# Patient Record
Sex: MALE | Race: WHITE | Employment: FULL TIME | URBAN - METROPOLITAN AREA
[De-identification: names, ages, dates, MRNs, and addresses within clinical notes are randomized per-mention and may not be internally consistent; named-entity substitution may affect disease eponyms.]

---

## 2023-02-20 ENCOUNTER — OFFICE VISIT (OUTPATIENT)
Dept: ORTHOPEDIC SURGERY | Age: 43
End: 2023-02-20
Payer: COMMERCIAL

## 2023-02-20 DIAGNOSIS — M25.511 ACUTE PAIN OF RIGHT SHOULDER: Primary | ICD-10-CM

## 2023-02-20 DIAGNOSIS — M75.81 ROTATOR CUFF TENDINITIS, RIGHT: ICD-10-CM

## 2023-02-20 PROCEDURE — 99203 OFFICE O/P NEW LOW 30 MIN: CPT | Performed by: ORTHOPAEDIC SURGERY

## 2023-02-20 NOTE — LETTER
2/20/2023    Patient: Tish Hutson   YOB: 1980   Date of Visit: 2/20/2023     Lizandro Baldwin MD  [address] [place] [id number]  Via Fax: [phone]    Dear Lizandro Baldwin MD,      Thank you for referring Mr. Tish Hutson to [facility] for evaluation. My notes for this consultation are attached. If you have questions, please do not hesitate to call me. I look forward to following your patient along with you.       Sincerely,    Christina Lim MD

## 2023-03-15 ENCOUNTER — OFFICE VISIT (OUTPATIENT)
Dept: ORTHOPEDIC SURGERY | Age: 43
End: 2023-03-15
Payer: COMMERCIAL

## 2023-03-15 DIAGNOSIS — M75.121 NONTRAUMATIC COMPLETE TEAR OF RIGHT ROTATOR CUFF: Primary | ICD-10-CM

## 2023-03-15 PROCEDURE — 99213 OFFICE O/P EST LOW 20 MIN: CPT | Performed by: ORTHOPAEDIC SURGERY

## 2023-03-15 NOTE — PROGRESS NOTES
Kevin Gramajo (: 1980) is a 39 y.o. male, patient, here for evaluation of the following chief complaint(s):  Shoulder Pain (Right shoulder )       HPI:    Patient returns to the office status post MRI evaluation of the right shoulder. Patient continues to have similar level of pain of the right shoulder as prior documented. Not on File    No current outpatient medications on file. No current facility-administered medications for this visit. No past medical history on file. No past surgical history on file. No family history on file. Social History     Socioeconomic History    Marital status:      Spouse name: Not on file    Number of children: Not on file    Years of education: Not on file    Highest education level: Not on file   Occupational History    Not on file   Tobacco Use    Smoking status: Not on file    Smokeless tobacco: Not on file   Substance and Sexual Activity    Alcohol use: Not on file    Drug use: Not on file    Sexual activity: Not on file   Other Topics Concern    Not on file   Social History Narrative    Not on file     Social Determinants of Health     Financial Resource Strain: Not on file   Food Insecurity: Not on file   Transportation Needs: Not on file   Physical Activity: Not on file   Stress: Not on file   Social Connections: Not on file   Intimate Partner Violence: Not on file   Housing Stability: Not on file       Review of Systems    Vitals: There were no vitals taken for this visit. There is no height or weight on file to calculate BMI. Ortho Exam       Patient is alert and oriented x3. He is in no acute distress. Right shoulder: No shoulder girdle atrophy. There is no soft tissue swelling, ecchymosis, abrasions or lacerations. Active range of motion is full with forward flexion, lateral abduction and external rotation. Internal rotation is to the upper lumbar level with a negative lift-off sign.   Passive range of motion is full with a positive impingement sign and a positive Quintanilla sign. Rotator cuff strength with forward flexion, lateral abduction and external rotation is intact with 5/5 strength but he does have pain with resisted abduction. There is no crepitation about the joint. Palpation of the [facility] joint does not reproduce discomfort, and there is no pain elicited with cross-body adduction. Strength of the extremity is 5/5 at biceps/triceps/wrist extension. DRT's are intact at +2/4 and  symmetrical.  Cervical range of motion is full with no pain to palpation along the paraspinal musculature medial border of the scapula. Spurling's sign is negative. Left shoulder: No shoulder girdle atrophy. There is no soft tissue swelling, ecchymosis, abrasions or lacerations. Active range of motion is full with forward flexion, lateral abduction and external rotation. Internal rotation is to the lumbar level with a negative lift-off sign. Passive range of motion is full with a negative impingement sign and a negative Quintanilla sign. Rotator cuff strength with forward flexion, lateral abduction and external rotation is intact with 5/5 strength. There is no crepitation about the joint. Palpation of the [facility] joint does not reproduce discomfort, and there is no pain elicited with cross-body adduction. Strength of the extremity is 5/5 at biceps/triceps/wrist extension. XR Results (most recent):  Results from Appointment encounter on 02/20/23     XR SHOULDER RT AP/LAT MIN 2 V     Narrative  Three-view x-ray of the right shoulder reveals no evidence of fracture dislocation no evidence of impingement. MRI evaluation shows evidence of a near full-thickness rotator cuff tendon tear along the leading edge of the infraspinatus in the posterior aspect of the supraspinatus    ASSESSMENT/PLAN:    I have gone over the MRI with the patient. This is a 50-year-old who has evidence of a rotator cuff tendon tear.   I talked to him about the natural history of near full-thickness rotator cuff tendon tear in a 39year-old. We talked about operative versus nonoperative management. He has a lot going on the summer. He would like to hold off a little bit on this. I think it is reasonable but certainly if his pain worsens this may be a sign of progression. He understands this. I have gone over the operative indications and the surgical technique in great detail. We discussed the risks and benefits. He is to call if his pain worsens.         Dmitri Allen MD

## 2023-03-15 NOTE — LETTER
3/15/2023    Patient: Gerardo Allison   YOB: 1980   Date of Visit: 3/15/2023     Anay Gaffney MD  [address] [place] [id number]  Via Fax: [phone]    Dear Anay Gaffney MD,      Thank you for referring Mr. Gerardo Allison to [facility] for evaluation. My notes for this consultation are attached. If you have questions, please do not hesitate to call me. I look forward to following your patient along with you.       Sincerely,    Queenie Jeans, MD
